# Patient Record
Sex: FEMALE | Race: WHITE
[De-identification: names, ages, dates, MRNs, and addresses within clinical notes are randomized per-mention and may not be internally consistent; named-entity substitution may affect disease eponyms.]

---

## 2020-10-13 ENCOUNTER — HOSPITAL ENCOUNTER (EMERGENCY)
Dept: HOSPITAL 54 - ER | Age: 55
Discharge: HOME | End: 2020-10-13
Payer: SELF-PAY

## 2020-10-13 VITALS — WEIGHT: 155 LBS | BODY MASS INDEX: 26.46 KG/M2 | HEIGHT: 64 IN

## 2020-10-13 VITALS — DIASTOLIC BLOOD PRESSURE: 69 MMHG | SYSTOLIC BLOOD PRESSURE: 128 MMHG
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
## 2020-10-13 DIAGNOSIS — S05.01XA: Primary | ICD-10-CM
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
## 2021-08-17 NOTE — NUR
PRESENTED TO THE ER VIA [facility] FROM HOME FOR C/O WORSENING SOB. PT TESTED + FOR 
COVID 19 SINCE 8/7. PER EMS PT  WAS HOLDING O2 SAT OF 85% ON R/A. PT WAS PLACED 
IN BED 5 ER, ON MONITOR AND SUPPLEMENTAL O2 OF 5LPM VIA NC. NEEDS ATTENDED . 
WILL CONT TO MONITOR ,

## 2021-08-17 NOTE — NUR
RN NOTE



RECEIVED PATIENT IN BED RESTING ALERT ORIENTED X3 VERBALLY RESPONSIVE ON 6L OXYGEN O2:95% IV 
SITE IS ON LEFT AC INTACT PATENT SAFETY MEASURE IMPLEMENT CALL LIGHT WITHIN REACH,CONTINUE 
TO MONITOR.

## 2021-08-17 NOTE — NUR
RN NOTE



PT TRANSFERRED FROM ER TO [facility]. PT IS NOW ON 6L OF 02 VIA NC SHOWING NO S/S OF 
RESP DISTRESS. PT IS A&OX3. PT NOW ON TELE MONITOR SHOWING NSR. SKIN INTACT. PT HAS LEFT AC 
GAUGE 18, FLUSHED, PATENT, AND INTACT WITH NO INFILTRATION. ALL SAFETY MEASURES IMPLEMENTED. 
CALL LIGHT WITHIN REACH. BED ALARM ON. BED LOCKED AND IN LOWEST POSITION. WILL CONTINUE TO 
MONITOR THROUGHOUT THE SHIFT.

## 2021-08-17 NOTE — NUR
TELE RN OPENING NOTE



PATIENT CURRENTLY LYING IN BED, AWAKE. A/O X3. ON 6L OXYGEN VIA NASAL CANNULA WITH O2 
SATURATION >95% AT REST. NO SOB NOTED. NO DISTRESS/DISCOMFORT NOTED AT THIS TIME. TELE 
MONITOR READS SINUS RHYTHM. IV ACCESS TO LEFT AC #18 - INTACT AND PATENT - SALINE LOCKED. 
SAFETY MEASURES IN PLACE. CALL LIGHT WITHIN REACH. WILL CONTINUE TO MONITOR.

## 2021-08-17 NOTE — NUR
RN NOTE



PT IN BED, AWAKE. ALERT AN ORIENTED. ON O2 AT 6L VIA NC. DENIES ANY PAIN OR SOB. NOT IN ANY 
DISTRESS. WILL CONTINUE TO MONITOR.

## 2021-08-17 NOTE — NUR
RN NOTE



NO CHANGES IN PT CONDITION DURING SHIFT. PT IS ON 6L OF O2 VIA NC WITH O2 SATURATION >95% 
SHOWING NO S/S OF RESP DISTRESS. PT IS ON TELE MONITOR SHOWING NSR. SKIN INTACT. PT IS 
A&OX3. LEFT AC FLUSHED, PATENT, AND INTACT WITH NO INFILTRATION. PT KEPT CLEAN AND 
COMFORTABLE. ALL SAFETY MEASURES IMPLEMENTED. CALL LIGHT WITHIN REACH. BED LOCKED AND IN 
LOWEST POSITION. BED ALARM ON. WILL ENDORSE TO MORNING SHIFT RN FOR JUAN R.

## 2021-08-18 NOTE — NUR
RN NOTE



PT SLEEPING AROUSES EASILY. CONTINUE ON O2 THERAPY AT 6L VIA NC, SATING 94-96%. DENIES ANY 
SOB. NO DISTRESS NOTED. TELE SHOWS SB/SR. PT ABLE TO MAKE NEEDS KNOWN. NEEDS WERE ATTENDED. 
NO SIGNIFICANT CHANGES NOTED. WILL ENDORSE TO NEXT SHIFT NURSE FOR JUAN R.

## 2021-08-18 NOTE — NUR
RN NOTE



NOTED LAC IV INFILTRATED, REMOVED. NEW IV LINE INSERTED ON RWRIST 22G, WITH GOOD BLOOD 
RETURN, FLUSHES WELL.

## 2021-08-18 NOTE — NUR
CONTINUITY OF CARE 

Patient in bed, A/O x4. On supplemental Oxygen at 5L via NC, denies chest pain. Sinus Maurilio 
HR 58 in the Tele monitor. Fall precaution maintained.

## 2021-08-18 NOTE — NUR
RN NOTE

PATIENT OBSERVED IN BED, AWAKE ALERT AND ORIENTED X4 ABLE TO VERBALIZE NEEDS, ON O2 VIA NC 
@5LPM O2 AT OF 96% BREATHING EVEN AND UNLABORED, CONTINUE TO TITRATE O2 AS TOLERATED, ON 
TELE MONITOR SB HR OF 58, NO COMPLAIN OF PAIN, ON DVT PPX NO BLEEDING NOTED, SAFETY MEASURES 
OBSERVED, BED WHEELS LOCK, CALL LIGHT WITHIN REACH, WILL CONTINUE TO MONITOR. WILL ENDORSE 
TO NOC SHIFT.

## 2021-08-18 NOTE — NUR
RN NOTE

PATIENT OBSERVED IN BED, AWAKE ALERT AND ORIENTED X4 ABLE TO VERBALIZE NEEDS, ON O2 VIA NC 
@6LPM O2S AT OF95% BREATHING EVEN AND UNLABORED, CONTINUE TO TITRATE O2 AS TOLERATED, ON 
TELE MONITOR SR HR OF 67, NO COMPLAIN OF PAIN, ON DVT PPX NO BLEEDING NOTED, SAFETY MEASURES 
OBSERVED, BED WHEELS LOCK, CALL LIGHT WITHIN REACH, WILL CONTINUE TO MONITOR.

## 2021-08-19 NOTE — NUR
TELE RN OPENING NOTES



RECEIVED PATIENT ASLEEP IN BED, EASY TO AROUSE. ALERT AND ORIENTED X4. NO SIGNS OR SYMPTOMS 
OF DISTRESS NOTED. BREATHING IS EVEN AND UNLABORED. NO COMPLAINTS OF PAIN AT THIS TIME. NO 
IV ACCESS RWRIST#22 PATENT AND INTACT.  PATIENT IS ON NC 5L WITH SPO2 SATURATING AT 94%-98%. 
PATIENT ON EXTERNAL TELE MONITOR WITH READING SB HR 45-47. SAFETY MEASURES ARE IN PLACE WITH 
BED LOCKED AT LOW POSITION, SIDE RAILS UP X 2. WILL CONTINUE TO MONITOR THROUGHOUT SHIFT.

## 2021-08-19 NOTE — NUR
TELE RN NOTES



PATIENT IS IN BED WATCHING TV. NO SIGNS OR SYMPTOMS OF DISTRESS. NO COMPLAINTS OF PAIN AT 
THIS TIME. PATIENT IS ON 5L NC SATURATING AT 94%-97%. WILL CONTINUE TO OBSERVE FOR ANY 
CHANGE IN CONDITION.

## 2021-08-19 NOTE — NUR
TELE RN NOTES



PLACED PATIENT ON ROOM AIR WITH RESULTING O2 SATURATION AT 88%-89%. WILL PLACE BACK ON 5L NC 
AT THIS TIME.

## 2021-08-19 NOTE — NUR
TELE RN CLOSING NOTES



PATIENT IS BED, RESTING. NO SIGNS OR SYMPTOMS OF DISTRESS NOTED. BREATHING IS EVEN AND 
UNLABORED. NO COMPLAINTS OF PAIN AT THIS TIME. IV ACCESS RWRIST#22 PATENT AND INTACT. ALL 
NEEDS MET THROUGHOUT SHIFT. PATIENT IS ON NC 5L WITH SPO2 SATURATING AT 94%-98%. PATIENT ON 
EXTERNAL TELE MONITOR WITH READING SB HR 53. SAFETY MEASURES MAINTAINED WITH BED LOCKED AT 
LOW POSITION, SIDE RAILS UP X 2. WILL ENDORSE CONTINUITY OF CARE TO ONCOMING SHIFT.

## 2021-08-19 NOTE — NUR
END OF SHIFT REPORT

Patient in bed, A/O x4. On supplement Oxygen 5L via NC, Oxygen saturation in high 90's. 
Sinus vicki HR 45, patient denies chest pain, headache, dizziness. On IV Rocephin and 
Zithromax, Afebrile. Encourage activity and proning while in bed. Fall precaution 
maintained.

## 2021-08-19 NOTE — NUR
TELE RN OPENING NOTES: RECEIVED PATIENT IN BED, AWAKE, A/O X4. NO S/S OF DISTRESS NOTED. NO 
COMPLAIN OF PAIN. CALL LIGHT WITHIN REACH. BED IN LOWEST AND LOCKED POSITION. HOB ELEVATED.

## 2021-08-20 NOTE — NUR
TELE RN CLOSING NOTES: PATIENT IN BED, AWAKE, A/O X4. NO S/S OF DISTRESS NOTED. NO COMPLAIN 
OF PAIN. CALL LIGHT WITHIN REACH. BED IN LOWEST AND LOCKED POSITION. RESTED THROUGHOUT THE 
NIGHT.

## 2021-08-20 NOTE — NUR
RN OPENING NOTE



RECEIVED PATIENT ON BED ALERT AND ORIENTED X 4. PATIENT ON OXYGEN AT 5LPM VIA NASAL CANULA 
SATURATING AT 92%. ENCOURAGED TO DO DEEP BREATHING EXERCISES. ON TELE MONITOR WITH CARDIAC 
READING OF SR AT 60 BPM. PATIENT WITH IV ACCESS ON RIGHT HAND G22 ON SALINE LOCK, PATENT AND 
INTACT. SAFETY MEASURES IN PLACE: BED LOCKED AND IN LOWEST POSITION, CALL LIGHT WITHIN 
REACH, SIDE RAILS UP. ISOLATION PRECAUTION IN PLACE. WILL MONITOR PATIENT CLOSELY.

## 2021-08-20 NOTE — NUR
RN CLOSING NOTE



PATIENT ON BED ALERT AND ORIENTED X 4. PATIENT ON OXYGEN AT 5LPM VIA NASAL CANULA SATURATING 
AT 92%. ENCOURAGED TO DO DEEP BREATHING EXERCISES. ON TELE MONITOR WITH CARDIAC READING OF 
SR AT 60 BPM. PATIENT WITH IV ACCESS ON RIGHT HAND G22 ON SALINE LOCK, PATENT AND INTACT. 
SAFETY MEASURES IN PLACE: BED LOCKED AND IN LOWEST POSITION, CALL LIGHT WITHIN REACH, SIDE 
RAILS UP. ISOLATION PRECAUTION IN PLACE.  ENDORSE PATIENT FOR CONTINUITY OF CARE.

## 2021-08-21 NOTE — NUR
RN OPENING NOTES



RECEIVED PT IN BED, A/O X 4. ON 5L O2 VIA NC, SATURATING @94%. NO SOB OR ANY S/S OF ACUTE 
RESPIRATORY DISTRESS NOTED. SR ON TELE MONITOR. IV ACCESS ON R HAND #22 INTACT, PATENT AND 
FLUSHED. NO PAIN REPORTED AT THIS TIME. SAFETY MEASURES IN PLACE. CALL LIGHT WITHIN REACH. 
BED LOCKED AND IN LOWEST POSITION WITH SIDE RAILS UP X2. ISOLATION PRECAUTION IN PLACE. WILL 
CONTINUE TO MONITOR.

## 2021-08-21 NOTE — NUR
RN CLOSING NOTES



NO SIGNIFICANT CHANGES THROUGHOUT THE SHIFT. NO SOB OR ANY DISTRESS NOTED. NO PAIN REPORTED 
AT THIS TIME. ALL DUE MEDS GIVEN. NEEDS ATTENDED. KEPT CLEAN AND COMFORTABLE. SAFETY 
MEASURES IN PLACE. WILL ENDORSE TO NIGHT RN FOR JUAN R.

## 2021-08-22 NOTE — NUR
RN notes



Alert and oriented x 4. Verbally able to communicate needs. In bed, resting comfortably. 
Noted with episodes of bradycardia in low 40's.  Pulse monitoring ordered. Visual monitoring 
done every 2 hours. No complaint of pain or discomfort. Kept clean and dry. Will endorse to 
next shift for continuity of care.

## 2021-08-22 NOTE — NUR
TELE/RN OPENING NOTES



RECEIVED PT IN BED, A/O X 4. ON 5L O2 VIA NC, SATURATING @94%. NO SOB OR ANY S/S OF ACUTE 
RESPIRATORY DISTRESS NOTED. SR ON TELE MONITOR. IV ACCESS ON R HAND #22 INTACT, PATENT AND 
FLUSHED. NO PAIN REPORTED AT THIS TIME. SAFETY MEASURES IN PLACE. CALL LIGHT WITHIN REACH. 
BED LOCKED AND IN LOWEST POSITION WITH SIDE RAILS UP X2. ISOLATION PRECAUTION IN PLACE. WILL 
CONTINUE TO MONITOR.

## 2021-08-22 NOTE — NUR
TELE RN NOTE



REC'D PT IN BED. PT IS A/O X4. [place]ogolese SPEAKING, UNDERSTANDS ENGLISH ABLE TO MAKE NEEDS 
KNOWN. PT IS ON 2L OF O2 VIA NC, O2 SAT 97% AT THIS TIME. NO S/S OF RESP DISTRESS OR SOB 
NOTED. PT IS ON TELE MONITORING. NSR WITH HEART RATE OF 61 AT THIS TIME, PT BASELINE SB/SR. 
PT IV SITE, INTACT FLUSHED. PT DENIES PAIN. ALL NEEDS ATTENDED AT THIS TIME. SAFETY MEASURES 
IN PLACE. HOB ELEVATED. SIDE RAILS UP X2. BED LOCKED IN LOWEST POSITION WITH BED ALARM ON. 
CALL LIGHT WITHIN REACH. WILL CONT TO MONITOR THROUGHOUT SHIFT.

## 2021-08-22 NOTE — NUR
TELE/RN CLOSING NOTES



PT IN BED, A/O X 4. OXYGEN NOW TITRATED TO 2 LPM VIA NASAL CANNULA, SATURATING @96%. NO SOB 
OR ANY S/S OF ACUTE RESPIRATORY DISTRESS NOTED. SR ON TELE MONITOR. IV ACCESS ON R HAND #22 
INTACT, PATENT AND FLUSHED. NO PAIN REPORTED AT THIS TIME. SAFETY MEASURES IN PLACE. CALL 
LIGHT WITHIN REACH. BED LOCKED AND IN LOWEST POSITION WITH SIDE RAILS UP X2. ISOLATION 
PRECAUTION IN PLACE. WILL ENDORSE TO THE NEXT SHIFT FOR JUAN R.

## 2021-08-23 NOTE — NUR
RN NOTES 

PT DESATURATED TO 86% ON ROOM AIR AT REST , PLACED ON 2L O2 N/C , O2 SAT UP TO 93% ON 2L N/C

## 2021-08-23 NOTE — NUR
TELE RN CLOSING NOTE



NO SIGNIFICANT CHANGE IN PT CONDITION, PT REMAINS ON 2L OF O2 VIA NC, SATURATING AT 97%. NO 
DISTRESS NOTED. BREATHING EVEN AND UNLABORED. HYGIENE PRODUCTS PROVIDED. PT DENIES PAIN. ALL 
NEEDS ATTENDED AT THIS TIME. SAFETY MEASURES IN PLACE. BED LOCKED LOWEST POSITION. SIDE 
RAILS UP X2. CALL LIGHT WITHIN REACH WILL ENDORSE TO AM SHIFT FOR CONTINUATION OF CARE.

## 2021-08-23 NOTE — NUR
TELE NOTES,

RECEIVED  PT ON  BED. PT IS A/O X4. [place] SPEAKING, UNDERSTANDS ENGLISH ABLE TO MAKE NEEDS 
KNOWN. PT IS ON 2L OF O2 VIA NC, O2 SAT WNL, C/O SOB AT TIMES ,  PT IS ON TELE , SB-ST,  IV 
SITE, INTACT FLUSHED. PT DENIES PAIN. ALL NEEDS ATTENDED AT THIS TIME. SAFETY MEASURES IN 
PLACE. HOB ELEVATED. SIDE RAILS UP X3. BED LOCKED IN LOWEST POSITION WITH BED ALARM ON. CALL 
LIGHT WITHIN REACH. WILL CONT TO MONITOR.

## 2021-08-23 NOTE — NUR
RN NOTES 

DISCHARGE INSTRUCTION GIVEN TO PT , VERBALIZES UNDERSTANDING, O2 SAT 93% ON RA. AWAITING FOR 
PT'S DAUGHTER TO PICK HER UP TO GO HOME.

## 2021-08-23 NOTE — NUR
RN NOTES 

IV SITE D/AZ, PT LEFT THE FLOOR TO MAIN ENTRANCE ACCOMPANIED BY DAUGHTER AND STAFF MEMBER 
IN STABLE CONDITION .